# Patient Record
Sex: FEMALE | Race: WHITE
[De-identification: names, ages, dates, MRNs, and addresses within clinical notes are randomized per-mention and may not be internally consistent; named-entity substitution may affect disease eponyms.]

---

## 2017-09-25 ENCOUNTER — HOSPITAL ENCOUNTER (OUTPATIENT)
Dept: HOSPITAL 92 - LABBT | Age: 77
Discharge: HOME | End: 2017-09-25
Attending: ORTHOPAEDIC SURGERY
Payer: MEDICARE

## 2017-09-25 DIAGNOSIS — M65.311: ICD-10-CM

## 2017-09-25 DIAGNOSIS — M19.041: ICD-10-CM

## 2017-09-25 DIAGNOSIS — Z01.818: Primary | ICD-10-CM

## 2017-09-25 LAB
ANION GAP SERPL CALC-SCNC: 12 MMOL/L (ref 10–20)
APTT PPP: 26.7 SEC (ref 22.9–36.1)
BASOPHILS # BLD AUTO: 0.1 THOU/UL (ref 0–0.2)
BASOPHILS NFR BLD AUTO: 1 % (ref 0–1)
BUN SERPL-MCNC: 15 MG/DL (ref 9.8–20.1)
CALCIUM SERPL-MCNC: 9.7 MG/DL (ref 7.8–10.44)
CHLORIDE SERPL-SCNC: 95 MMOL/L (ref 98–107)
CO2 SERPL-SCNC: 34 MMOL/L (ref 23–31)
CREAT CL PREDICTED SERPL C-G-VRATE: 0 ML/MIN (ref 70–130)
EOSINOPHIL # BLD AUTO: 0.3 THOU/UL (ref 0–0.7)
EOSINOPHIL NFR BLD AUTO: 4.8 % (ref 0–10)
HCT VFR BLD CALC: 39 % (ref 36–47)
LYMPHOCYTES # BLD: 1.7 THOU/UL (ref 1.2–3.4)
LYMPHOCYTES NFR BLD AUTO: 30.1 % (ref 21–51)
MONOCYTES # BLD AUTO: 0.3 THOU/UL (ref 0.11–0.59)
MONOCYTES NFR BLD AUTO: 6.2 % (ref 0–10)
NEUTROPHILS # BLD AUTO: 3.2 THOU/UL (ref 1.4–6.5)
PROTHROMBIN TIME: 14.1 SEC (ref 12–14.7)
RBC # BLD AUTO: 4.35 MILL/UL (ref 4.2–5.4)
WBC # BLD AUTO: 5.5 THOU/UL (ref 4.8–10.8)

## 2017-09-25 PROCEDURE — 71020: CPT

## 2017-09-25 PROCEDURE — 93010 ELECTROCARDIOGRAM REPORT: CPT

## 2017-09-25 PROCEDURE — 85610 PROTHROMBIN TIME: CPT

## 2017-09-25 PROCEDURE — 93005 ELECTROCARDIOGRAM TRACING: CPT

## 2017-09-25 PROCEDURE — 85025 COMPLETE CBC W/AUTO DIFF WBC: CPT

## 2017-09-25 PROCEDURE — 80048 BASIC METABOLIC PNL TOTAL CA: CPT

## 2017-09-25 PROCEDURE — 85730 THROMBOPLASTIN TIME PARTIAL: CPT

## 2017-09-25 NOTE — RAD
CHEST TWO VIEWS:

 

History: Pre-operative. 

 

Comparison: 2-16-15

 

FINDINGS: 

Cardiac silhouette and pulmonary vasculature are unremarkable. Mediastinum is midline with post-oper
ative changes of the cervical spine partially visualized. There is no confluent airspace consolidati
on, pneumothorax, or pleural fluid apparent. Degenerative changes involve the thoracic spine on the 
lateral view. 

 

IMPRESSION: 

No active cardiopulmonary abnormalities are demonstrated. 

 

POS: [facility]

## 2017-09-26 ENCOUNTER — HOSPITAL ENCOUNTER (OUTPATIENT)
Dept: HOSPITAL 92 - SDC | Age: 77
Discharge: HOME | End: 2017-09-26
Attending: ORTHOPAEDIC SURGERY
Payer: MEDICARE

## 2017-09-26 VITALS — BODY MASS INDEX: 42.7 KG/M2

## 2017-09-26 DIAGNOSIS — I10: ICD-10-CM

## 2017-09-26 DIAGNOSIS — Z90.49: ICD-10-CM

## 2017-09-26 DIAGNOSIS — E11.9: ICD-10-CM

## 2017-09-26 DIAGNOSIS — Z90.710: ICD-10-CM

## 2017-09-26 DIAGNOSIS — Z88.0: ICD-10-CM

## 2017-09-26 DIAGNOSIS — Z79.84: ICD-10-CM

## 2017-09-26 DIAGNOSIS — E78.5: ICD-10-CM

## 2017-09-26 DIAGNOSIS — Z98.890: ICD-10-CM

## 2017-09-26 DIAGNOSIS — M65.311: ICD-10-CM

## 2017-09-26 DIAGNOSIS — M25.741: ICD-10-CM

## 2017-09-26 DIAGNOSIS — Z88.8: ICD-10-CM

## 2017-09-26 DIAGNOSIS — S63.041A: ICD-10-CM

## 2017-09-26 DIAGNOSIS — Z79.899: ICD-10-CM

## 2017-09-26 DIAGNOSIS — E66.01: ICD-10-CM

## 2017-09-26 DIAGNOSIS — K21.9: ICD-10-CM

## 2017-09-26 DIAGNOSIS — M19.041: Primary | ICD-10-CM

## 2017-09-26 DIAGNOSIS — Z79.82: ICD-10-CM

## 2017-09-26 DIAGNOSIS — Z88.1: ICD-10-CM

## 2017-09-26 DIAGNOSIS — Z88.2: ICD-10-CM

## 2017-09-26 DIAGNOSIS — K64.9: ICD-10-CM

## 2017-09-26 PROCEDURE — 76001: CPT

## 2017-09-26 PROCEDURE — 73140 X-RAY EXAM OF FINGER(S): CPT

## 2017-09-26 PROCEDURE — 25332 REVISE WRIST JOINT: CPT

## 2017-09-26 PROCEDURE — A4216 STERILE WATER/SALINE, 10 ML: HCPCS

## 2017-09-26 PROCEDURE — S0020 INJECTION, BUPIVICAINE HYDRO: HCPCS

## 2017-09-26 PROCEDURE — C1776 JOINT DEVICE (IMPLANTABLE): HCPCS

## 2017-09-26 PROCEDURE — 0RUS07Z SUPPLEMENT RIGHT CARPOMETACARPAL JOINT WITH AUTOLOGOUS TISSUE SUBSTITUTE, OPEN APPROACH: ICD-10-PCS | Performed by: ORTHOPAEDIC SURGERY

## 2017-09-26 PROCEDURE — 26055 INCISE FINGER TENDON SHEATH: CPT

## 2017-09-26 PROCEDURE — 0LN70ZZ RELEASE RIGHT HAND TENDON, OPEN APPROACH: ICD-10-PCS | Performed by: ORTHOPAEDIC SURGERY

## 2017-09-26 PROCEDURE — 0PTM0ZZ RESECTION OF RIGHT CARPAL, OPEN APPROACH: ICD-10-PCS | Performed by: ORTHOPAEDIC SURGERY

## 2017-09-26 PROCEDURE — 96374 THER/PROPH/DIAG INJ IV PUSH: CPT

## 2017-09-26 PROCEDURE — 20924 REMOVAL OF TENDON FOR GRAFT: CPT

## 2017-09-26 NOTE — EKG
Test Reason : PREOP

Blood Pressure : ***/*** mmHG

Vent. Rate : 064 BPM     Atrial Rate : 064 BPM

   P-R Int : 272 ms          QRS Dur : 102 ms

    QT Int : 420 ms       P-R-T Axes : 060 -28 036 degrees

   QTc Int : 433 ms

 

Sinus rhythm with 1st degree A-V block

Incomplete right bundle branch block

Borderline ECG

When compared with ECG of 20-NOV-2015 23:44,

No significant change was found

Confirmed by LEANDRA DAVIDSON ([id number]) on 9/26/2017 10:01:12 AM

 

Referred By:  ZINA           Confirmed By:LEANDRA DAVIDSON

## 2017-09-26 NOTE — RAD
THREE VIEWS OF THE RIGHT WRIST/THUMB:

 

FINDINGS/IMPRESSION:

Multiple limited intraoperative fluoroscopic views of the right wrist and thumb were submitted for i
nterpretation.  The patient has ongoing removal of the trapezium.  A K-wire spans the first and seco
nd metacarpals at the completion of the exam.

 

POS: [facility]

## 2017-09-27 NOTE — OP
DATE OF PROCEDURE:  09/26/2017

 

PREOPERATIVE DIAGNOSES:

1.  Right thumb stage IV carpometacarpal joint osteoarthritis and subluxation.

2.  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

 

POSTOPERATIVE DIAGNOSES:

1.  Right thumb stage IV carpometacarpal joint osteoarthritis and subluxation.

2.  Right thumb trigger digit stage III to IV with marked osteophyte formation, subluxation, bone on
 bone eburnation of more than 70% total joint surface area of metacarpal joint, very tight A1 pulley
 at the thumb inferolateral to the carpometacarpal joint thumb.

 

PROCEDURE PERFORMED:

1.  A1 pulley release thumb/trigger thumb release.

2.  Complete trapeziectomy, right thumb.

3.  [place] flexor carpi radialis tendon for tendon transfer.

4.  Carpometacarpal joint arthroplasty, ligament replacement, tendon interposition x6, C-arm supervi
tony with metacarpal for the thumb to index finger pinning.

 

COMPLICATIONS:  None.

 

TOURNIQUET TIME:  110 minutes.

 

INDICATIONS:  Failed conservative treatment with the findings, both preoperative and intraoperative 
listed above.

 

ANESTHESIA:  General LMA technique augmented by 20 mL 0.5% Marcaine total with no epinephrine done b
y [employer] [facility].

 

DESCRIPTION OF PROCEDURE:  After successful general anesthesia, the limb was prepped and draped.  C-
arm was brought to the field, confirmed the eburnated bone findings.  Once this was done, the limb w
as exsanguinated, tourniquet inflated to 250 mmHg pressure. The incisions were outlined to include a
 modified _____ incision at junction dorsal palmar skin along the carpometacarpal joint to the scaph
oid base distally.  We then also outlined incision for the A1 pulley where we made a longitudinal in
cision.  First, we did A1 pulley incision, carried to skin and subcutaneous tissue, identified the r
adial and ulnar nerve branches by taking from the center field.  The A1 pulley was very tight and th
ickened, so it was released with [place] blade in midline under direct visualization.  A 2 mL [place]
ne was placed and the wound was closed with interrupted 4-0 nylon.

 

Then, we made the curvilinear incision as approach to the carpometacarpal joint and junction to dors
al palmar skin.  Superficial radial nerve branch was identified and spared, we then lifted at the ju
nction of the fascia to the bone, lifted the thenar musculature up and then visualized the entire ca
rpometacarpal joint.  The incision was made _____ protecting the insertion of the muscles on the bas
e of the thumb, then we tagged the joint capsule with a simple 2-0 Vicryl.  At this point, we began 
our sharp dissection of the trapezium to perform a trapeziectomy,  identified the flexor carpal radi
tarsha terminal 5 cm protecting this.  We then placed a K-wire into the trapezoid to help lever it, to
ok an x-ray to confirm the trapezoid and visualized at the trapezium surface with the base of metaca
rpal set this bone with chondral loss and it was over 50% loss of bone with chondral loss, and we ex
posed bone at the base of the thumb.  All osteophytes removed from the thumb after the trapeziectomy
 was completed, then the chondral surfaces were shaved with a saw.  Then, we removed the C-arm from 
the field in line with the plain of thumb metacarpal drilled a hole with a 1.2 cm bone age on the la
teral portion of the thumb obliquely into the junction of the articular surface and the metacarpal o
n the 2nd metacarpal and of the first metacarpal.  This hole was then widened to 3.5 mm and curetted
 until it was progressively larger.  It was in perfect position in the sagittal plane and there were
 no complications from this drilling.  We irrigated the area.  We then put a heavy 3-0 Prolene sutur
e in the very deepest and ulnar portion of the carpometacarpal joint capsule just below the flexor c
arpal radialis tendon insertion.

 

We turned our attention now to the forearm where we made two small incisions, one 7 cm from the palm
ar wrist flexor crease, another 7 cm from the first incision, carried through the skin and subcutane
ous tissue, identified flexor carpi radialis and then harvested the tendon at the musculotendinous j
unction, brought this into the wrist.  We then removed all muscle, used a Vicryl 2-0 stitch to decre
ase the _____ so it could pass through the tunnel and passed it through the tunnel from the index fi
nger to the radial edge of the thumb metacarpal.  We then made sure it was completely free with no s
lack.  Once this was done, we brought the C-arm back to the field, pinned the metacarpal, and then w
as able to appropriately tension it in 4 different spots to include 2 on the thumb metacarpal, 1 on 
the extensor insertion to the thumb, and then a final 1 onto the flexor carpi radialis itself.   Onc
e this was done, and it was weaved deep to the flexor carpi radialis tendon, then we brought 2 [place]
 needles onto the field, placed the two 3-0 suture that had been run through the capsule earlier on 
the other [place] needle and formed the anchovy portion of the interpositional graft.  It was sutured 
down to the capsule with excellent tension and the primary limb of the flexor carpi radialis tendon 
was in the center of the base of the thumb in excellent position 1-2 mm over reduced in terms of rel
ation to the index finger metacarpal.  Tourniquet was deflated.  Hemostasis obtained.  The joint cap
harshad closed with 2-0 Vicryl over the ligament replacement portion of the tendon and position graft. 
 Then, hemostasis was obtained.  The fascia was then sutured back with 3-0 Monocryl, subcutaneous cl
osure with 4-0 Monocryl.  The pin was then cut below the skin and the final epidermal closure was ac
complished with 4-0 nylon interrupted central pattern.  Following injection of 10 mL 0.5% Marcaine g
iven along this incision, bulky dressing was applied with a splint and the patient left the operatin
g room without evidence of anesthetic or operative complication.

## 2018-10-19 ENCOUNTER — HOSPITAL ENCOUNTER (EMERGENCY)
Dept: HOSPITAL 92 - ERS | Age: 78
Discharge: HOME | End: 2018-10-19
Payer: MEDICARE

## 2018-10-19 DIAGNOSIS — E78.5: ICD-10-CM

## 2018-10-19 DIAGNOSIS — E11.9: ICD-10-CM

## 2018-10-19 DIAGNOSIS — I10: ICD-10-CM

## 2018-10-19 DIAGNOSIS — M54.16: Primary | ICD-10-CM

## 2018-10-19 DIAGNOSIS — M19.90: ICD-10-CM

## 2018-10-19 PROCEDURE — 96372 THER/PROPH/DIAG INJ SC/IM: CPT

## 2021-04-08 ENCOUNTER — HOSPITAL ENCOUNTER (EMERGENCY)
Dept: HOSPITAL 92 - ERS | Age: 81
Discharge: HOME | End: 2021-04-08
Payer: MEDICARE

## 2021-04-08 DIAGNOSIS — E11.9: ICD-10-CM

## 2021-04-08 DIAGNOSIS — R19.04: ICD-10-CM

## 2021-04-08 DIAGNOSIS — G47.30: ICD-10-CM

## 2021-04-08 DIAGNOSIS — Z79.899: ICD-10-CM

## 2021-04-08 DIAGNOSIS — E78.5: ICD-10-CM

## 2021-04-08 DIAGNOSIS — I10: ICD-10-CM

## 2021-04-08 DIAGNOSIS — K21.9: ICD-10-CM

## 2021-04-08 DIAGNOSIS — R19.7: Primary | ICD-10-CM

## 2021-04-08 DIAGNOSIS — E03.9: ICD-10-CM

## 2021-04-08 DIAGNOSIS — Z79.84: ICD-10-CM

## 2021-04-08 DIAGNOSIS — Z79.82: ICD-10-CM

## 2021-04-08 LAB
ALBUMIN SERPL BCG-MCNC: 3.9 G/DL (ref 3.4–4.8)
ALP SERPL-CCNC: 73 U/L (ref 40–110)
ALT SERPL W P-5'-P-CCNC: 10 U/L (ref 8–55)
ANION GAP SERPL CALC-SCNC: 17 MMOL/L (ref 10–20)
AST SERPL-CCNC: 19 U/L (ref 5–34)
BACTERIA UR QL AUTO: (no result) HPF
BASOPHILS # BLD AUTO: 0 THOU/UL (ref 0–0.2)
BASOPHILS NFR BLD AUTO: 0.1 % (ref 0–1)
BILIRUB SERPL-MCNC: 1 MG/DL (ref 0.2–1.2)
BUN SERPL-MCNC: 16 MG/DL (ref 9.8–20.1)
CALCIUM SERPL-MCNC: 9 MG/DL (ref 7.8–10.44)
CHLORIDE SERPL-SCNC: 95 MMOL/L (ref 98–107)
CO2 SERPL-SCNC: 29 MMOL/L (ref 23–31)
CREAT CL PREDICTED SERPL C-G-VRATE: 0 ML/MIN (ref 70–130)
EOSINOPHIL # BLD AUTO: 0.3 THOU/UL (ref 0–0.7)
EOSINOPHIL NFR BLD AUTO: 4.8 % (ref 0–10)
GLOBULIN SER CALC-MCNC: 3.2 G/DL (ref 2.4–3.5)
GLUCOSE SERPL-MCNC: 148 MG/DL (ref 83–110)
HGB BLD-MCNC: 14.2 G/DL (ref 12–16)
LYMPHOCYTES # BLD: 1.3 THOU/UL (ref 1.2–3.4)
LYMPHOCYTES NFR BLD AUTO: 21.3 % (ref 21–51)
MCH RBC QN AUTO: 27.7 PG (ref 27–31)
MCV RBC AUTO: 86.3 FL (ref 78–98)
MONOCYTES # BLD AUTO: 0.5 THOU/UL (ref 0.11–0.59)
MONOCYTES NFR BLD AUTO: 7.2 % (ref 0–10)
NEUTROPHILS # BLD AUTO: 4.2 THOU/UL (ref 1.4–6.5)
NEUTROPHILS NFR BLD AUTO: 66.6 % (ref 42–75)
PLATELET # BLD AUTO: 215 THOU/UL (ref 130–400)
POTASSIUM SERPL-SCNC: 3.2 MMOL/L (ref 3.5–5.1)
PROT UR STRIP.AUTO-MCNC: 70 MG/DL
RBC # BLD AUTO: 5.12 MILL/UL (ref 4.2–5.4)
RBC UR QL AUTO: (no result) HPF (ref 0–3)
SODIUM SERPL-SCNC: 138 MMOL/L (ref 136–145)
SP GR UR STRIP: 1.02 (ref 1–1.04)
WBC # BLD AUTO: 6.3 THOU/UL (ref 4.8–10.8)
WBC UR QL AUTO: (no result) HPF (ref 0–3)

## 2021-04-08 PROCEDURE — 81015 MICROSCOPIC EXAM OF URINE: CPT

## 2021-04-08 PROCEDURE — 80053 COMPREHEN METABOLIC PANEL: CPT

## 2021-04-08 PROCEDURE — 51701 INSERT BLADDER CATHETER: CPT

## 2021-04-08 PROCEDURE — 71045 X-RAY EXAM CHEST 1 VIEW: CPT

## 2021-04-08 PROCEDURE — 96374 THER/PROPH/DIAG INJ IV PUSH: CPT

## 2021-04-08 PROCEDURE — 85025 COMPLETE CBC W/AUTO DIFF WBC: CPT

## 2021-04-08 PROCEDURE — 96376 TX/PRO/DX INJ SAME DRUG ADON: CPT

## 2021-04-08 PROCEDURE — 74177 CT ABD & PELVIS W/CONTRAST: CPT

## 2021-04-08 PROCEDURE — 81003 URINALYSIS AUTO W/O SCOPE: CPT

## 2021-04-08 PROCEDURE — 96375 TX/PRO/DX INJ NEW DRUG ADDON: CPT

## 2021-04-08 PROCEDURE — 87086 URINE CULTURE/COLONY COUNT: CPT

## 2021-04-13 ENCOUNTER — HOSPITAL ENCOUNTER (EMERGENCY)
Dept: HOSPITAL 92 - ERS | Age: 81
Discharge: HOME | End: 2021-04-13
Payer: MEDICARE

## 2021-04-13 DIAGNOSIS — E03.9: ICD-10-CM

## 2021-04-13 DIAGNOSIS — G47.30: ICD-10-CM

## 2021-04-13 DIAGNOSIS — R19.7: ICD-10-CM

## 2021-04-13 DIAGNOSIS — R63.0: ICD-10-CM

## 2021-04-13 DIAGNOSIS — K21.9: ICD-10-CM

## 2021-04-13 DIAGNOSIS — E11.9: ICD-10-CM

## 2021-04-13 DIAGNOSIS — R10.84: Primary | ICD-10-CM

## 2021-04-13 DIAGNOSIS — R11.0: ICD-10-CM

## 2021-04-13 DIAGNOSIS — E78.5: ICD-10-CM

## 2021-04-13 DIAGNOSIS — I10: ICD-10-CM

## 2021-04-13 LAB
ALBUMIN SERPL BCG-MCNC: 4 G/DL (ref 3.4–4.8)
ALP SERPL-CCNC: 70 U/L (ref 40–110)
ALT SERPL W P-5'-P-CCNC: 12 U/L (ref 8–55)
ANION GAP SERPL CALC-SCNC: 18 MMOL/L (ref 10–20)
AST SERPL-CCNC: 28 U/L (ref 5–34)
BASOPHILS # BLD AUTO: 0 THOU/UL (ref 0–0.2)
BASOPHILS NFR BLD AUTO: 0.3 % (ref 0–1)
BILIRUB SERPL-MCNC: 1.1 MG/DL (ref 0.2–1.2)
BUN SERPL-MCNC: 14 MG/DL (ref 9.8–20.1)
CALCIUM SERPL-MCNC: 9.2 MG/DL (ref 7.8–10.44)
CHLORIDE SERPL-SCNC: 96 MMOL/L (ref 98–107)
CO2 SERPL-SCNC: 28 MMOL/L (ref 23–31)
CREAT CL PREDICTED SERPL C-G-VRATE: 0 ML/MIN (ref 70–130)
EOSINOPHIL # BLD AUTO: 0.3 THOU/UL (ref 0–0.7)
EOSINOPHIL NFR BLD AUTO: 4.4 % (ref 0–10)
GLOBULIN SER CALC-MCNC: 3.5 G/DL (ref 2.4–3.5)
GLUCOSE SERPL-MCNC: 141 MG/DL (ref 83–110)
HGB BLD-MCNC: 15 G/DL (ref 12–16)
LIPASE SERPL-CCNC: 14 U/L (ref 8–78)
LYMPHOCYTES # BLD: 1.5 THOU/UL (ref 1.2–3.4)
LYMPHOCYTES NFR BLD AUTO: 21.7 % (ref 21–51)
MCH RBC QN AUTO: 27.8 PG (ref 27–31)
MCV RBC AUTO: 86.6 FL (ref 78–98)
MONOCYTES # BLD AUTO: 0.5 THOU/UL (ref 0.11–0.59)
MONOCYTES NFR BLD AUTO: 7.7 % (ref 0–10)
NEUTROPHILS # BLD AUTO: 4.5 THOU/UL (ref 1.4–6.5)
NEUTROPHILS NFR BLD AUTO: 65.8 % (ref 42–75)
PLATELET # BLD AUTO: 206 THOU/UL (ref 130–400)
POTASSIUM SERPL-SCNC: 3.5 MMOL/L (ref 3.5–5.1)
PROT UR STRIP.AUTO-MCNC: 50 MG/DL
RBC # BLD AUTO: 5.39 MILL/UL (ref 4.2–5.4)
RBC UR QL AUTO: (no result) HPF (ref 0–3)
SODIUM SERPL-SCNC: 138 MMOL/L (ref 136–145)
SP GR UR STRIP: 1.02 (ref 1–1.04)
WBC # BLD AUTO: 6.9 THOU/UL (ref 4.8–10.8)
WBC UR QL AUTO: (no result) HPF (ref 0–3)

## 2021-04-13 PROCEDURE — 36415 COLL VENOUS BLD VENIPUNCTURE: CPT

## 2021-04-13 PROCEDURE — 83735 ASSAY OF MAGNESIUM: CPT

## 2021-04-13 PROCEDURE — 96375 TX/PRO/DX INJ NEW DRUG ADDON: CPT

## 2021-04-13 PROCEDURE — 81003 URINALYSIS AUTO W/O SCOPE: CPT

## 2021-04-13 PROCEDURE — 51701 INSERT BLADDER CATHETER: CPT

## 2021-04-13 PROCEDURE — 80053 COMPREHEN METABOLIC PANEL: CPT

## 2021-04-13 PROCEDURE — 96365 THER/PROPH/DIAG IV INF INIT: CPT

## 2021-04-13 PROCEDURE — 85025 COMPLETE CBC W/AUTO DIFF WBC: CPT

## 2021-04-13 PROCEDURE — 83690 ASSAY OF LIPASE: CPT

## 2021-04-13 PROCEDURE — 81015 MICROSCOPIC EXAM OF URINE: CPT

## 2021-04-13 PROCEDURE — 93005 ELECTROCARDIOGRAM TRACING: CPT

## 2021-04-18 ENCOUNTER — HOSPITAL ENCOUNTER (INPATIENT)
Dept: HOSPITAL 92 - ERS | Age: 81
LOS: 2 days | Discharge: HOME HEALTH | DRG: 308 | End: 2021-04-20
Attending: INTERNAL MEDICINE | Admitting: INTERNAL MEDICINE
Payer: MEDICARE

## 2021-04-18 VITALS — BODY MASS INDEX: 33.8 KG/M2

## 2021-04-18 DIAGNOSIS — Z98.84: ICD-10-CM

## 2021-04-18 DIAGNOSIS — Z90.710: ICD-10-CM

## 2021-04-18 DIAGNOSIS — Z88.1: ICD-10-CM

## 2021-04-18 DIAGNOSIS — R00.1: Primary | ICD-10-CM

## 2021-04-18 DIAGNOSIS — Z79.82: ICD-10-CM

## 2021-04-18 DIAGNOSIS — Z20.822: ICD-10-CM

## 2021-04-18 DIAGNOSIS — I11.0: ICD-10-CM

## 2021-04-18 DIAGNOSIS — I50.33: ICD-10-CM

## 2021-04-18 DIAGNOSIS — E87.6: ICD-10-CM

## 2021-04-18 DIAGNOSIS — F32.9: ICD-10-CM

## 2021-04-18 DIAGNOSIS — Z88.8: ICD-10-CM

## 2021-04-18 DIAGNOSIS — E11.9: ICD-10-CM

## 2021-04-18 DIAGNOSIS — G47.33: ICD-10-CM

## 2021-04-18 DIAGNOSIS — I45.10: ICD-10-CM

## 2021-04-18 DIAGNOSIS — Z98.1: ICD-10-CM

## 2021-04-18 DIAGNOSIS — Z90.49: ICD-10-CM

## 2021-04-18 DIAGNOSIS — Z88.0: ICD-10-CM

## 2021-04-18 DIAGNOSIS — Z88.2: ICD-10-CM

## 2021-04-18 DIAGNOSIS — E78.00: ICD-10-CM

## 2021-04-18 DIAGNOSIS — E03.9: ICD-10-CM

## 2021-04-18 DIAGNOSIS — F41.9: ICD-10-CM

## 2021-04-18 DIAGNOSIS — R77.8: ICD-10-CM

## 2021-04-18 DIAGNOSIS — T44.7X5A: ICD-10-CM

## 2021-04-18 DIAGNOSIS — E66.9: ICD-10-CM

## 2021-04-18 DIAGNOSIS — Z79.899: ICD-10-CM

## 2021-04-18 LAB
ALBUMIN SERPL BCG-MCNC: 4.2 G/DL (ref 3.4–4.8)
ALP SERPL-CCNC: 79 U/L (ref 40–110)
ALT SERPL W P-5'-P-CCNC: 11 U/L (ref 8–55)
ANION GAP SERPL CALC-SCNC: 13 MMOL/L (ref 10–20)
AST SERPL-CCNC: 20 U/L (ref 5–34)
BASOPHILS # BLD AUTO: 0 THOU/UL (ref 0–0.2)
BASOPHILS NFR BLD AUTO: 0.1 % (ref 0–1)
BILIRUB SERPL-MCNC: 0.8 MG/DL (ref 0.2–1.2)
BUN SERPL-MCNC: 19 MG/DL (ref 9.8–20.1)
CALCIUM SERPL-MCNC: 9.7 MG/DL (ref 7.8–10.44)
CHLORIDE SERPL-SCNC: 96 MMOL/L (ref 98–107)
CK MB SERPL-MCNC: 2.6 NG/ML (ref 0–6.6)
CO2 SERPL-SCNC: 33 MMOL/L (ref 23–31)
CREAT CL PREDICTED SERPL C-G-VRATE: 0 ML/MIN (ref 70–130)
EOSINOPHIL # BLD AUTO: 0.4 THOU/UL (ref 0–0.7)
EOSINOPHIL NFR BLD AUTO: 6.1 % (ref 0–10)
GLOBULIN SER CALC-MCNC: 3.1 G/DL (ref 2.4–3.5)
GLUCOSE SERPL-MCNC: 150 MG/DL (ref 83–110)
HGB BLD-MCNC: 15.2 G/DL (ref 12–16)
LYMPHOCYTES # BLD: 1.6 THOU/UL (ref 1.2–3.4)
LYMPHOCYTES NFR BLD AUTO: 26.8 % (ref 21–51)
MCH RBC QN AUTO: 29.8 PG (ref 27–31)
MCV RBC AUTO: 87 FL (ref 78–98)
MONOCYTES # BLD AUTO: 0.5 THOU/UL (ref 0.11–0.59)
MONOCYTES NFR BLD AUTO: 7.5 % (ref 0–10)
NEUTROPHILS # BLD AUTO: 3.6 THOU/UL (ref 1.4–6.5)
NEUTROPHILS NFR BLD AUTO: 59.5 % (ref 42–75)
PLATELET # BLD AUTO: 175 THOU/UL (ref 130–400)
POTASSIUM SERPL-SCNC: 3.4 MMOL/L (ref 3.5–5.1)
RBC # BLD AUTO: 5.11 MILL/UL (ref 4.2–5.4)
SODIUM SERPL-SCNC: 139 MMOL/L (ref 136–145)
SP GR UR STRIP: 1.01 (ref 1–1.04)
TROPONIN I SERPL DL<=0.01 NG/ML-MCNC: 0.04 NG/ML (ref ?–0.03)
TROPONIN I SERPL DL<=0.01 NG/ML-MCNC: 0.05 NG/ML (ref ?–0.03)
WBC # BLD AUTO: 6 THOU/UL (ref 4.8–10.8)

## 2021-04-18 PROCEDURE — U0003 INFECTIOUS AGENT DETECTION BY NUCLEIC ACID (DNA OR RNA); SEVERE ACUTE RESPIRATORY SYNDROME CORONAVIRUS 2 (SARS-COV-2) (CORONAVIRUS DISEASE [COVID-19]), AMPLIFIED PROBE TECHNIQUE, MAKING USE OF HIGH THROUGHPUT TECHNOLOGIES AS DESCRIBED BY CMS-2020-01-R: HCPCS

## 2021-04-18 PROCEDURE — 36415 COLL VENOUS BLD VENIPUNCTURE: CPT

## 2021-04-18 PROCEDURE — 96376 TX/PRO/DX INJ SAME DRUG ADON: CPT

## 2021-04-18 PROCEDURE — 84484 ASSAY OF TROPONIN QUANT: CPT

## 2021-04-18 PROCEDURE — G0378 HOSPITAL OBSERVATION PER HR: HCPCS

## 2021-04-18 PROCEDURE — 51701 INSERT BLADDER CATHETER: CPT

## 2021-04-18 PROCEDURE — 81003 URINALYSIS AUTO W/O SCOPE: CPT

## 2021-04-18 PROCEDURE — 80053 COMPREHEN METABOLIC PANEL: CPT

## 2021-04-18 PROCEDURE — 96374 THER/PROPH/DIAG INJ IV PUSH: CPT

## 2021-04-18 PROCEDURE — 36416 COLLJ CAPILLARY BLOOD SPEC: CPT

## 2021-04-18 PROCEDURE — 74177 CT ABD & PELVIS W/CONTRAST: CPT

## 2021-04-18 PROCEDURE — 93005 ELECTROCARDIOGRAM TRACING: CPT

## 2021-04-18 PROCEDURE — 83880 ASSAY OF NATRIURETIC PEPTIDE: CPT

## 2021-04-18 PROCEDURE — 80048 BASIC METABOLIC PNL TOTAL CA: CPT

## 2021-04-18 PROCEDURE — U0005 INFEC AGEN DETEC AMPLI PROBE: HCPCS

## 2021-04-18 PROCEDURE — 82553 CREATINE MB FRACTION: CPT

## 2021-04-18 PROCEDURE — 96372 THER/PROPH/DIAG INJ SC/IM: CPT

## 2021-04-18 PROCEDURE — 87635 SARS-COV-2 COVID-19 AMP PRB: CPT

## 2021-04-18 PROCEDURE — 85025 COMPLETE CBC W/AUTO DIFF WBC: CPT

## 2021-04-18 PROCEDURE — 83735 ASSAY OF MAGNESIUM: CPT

## 2021-04-18 PROCEDURE — 86304 IMMUNOASSAY TUMOR CA 125: CPT

## 2021-04-18 PROCEDURE — 93306 TTE W/DOPPLER COMPLETE: CPT

## 2021-04-18 PROCEDURE — 76856 US EXAM PELVIC COMPLETE: CPT

## 2021-04-18 RX ADMIN — NITROGLYCERIN SCH: 20 OINTMENT TOPICAL at 10:49

## 2021-04-18 RX ADMIN — Medication SCH ML: at 21:22

## 2021-04-18 RX ADMIN — NITROGLYCERIN SCH: 20 OINTMENT TOPICAL at 17:07

## 2021-04-18 RX ADMIN — ASPIRIN 81 MG CHEWABLE TABLET SCH: 81 TABLET CHEWABLE at 10:11

## 2021-04-18 RX ADMIN — THERA TABS SCH TAB: TAB at 10:07

## 2021-04-18 RX ADMIN — Medication SCH: at 10:49

## 2021-04-19 LAB
ANION GAP SERPL CALC-SCNC: 12 MMOL/L (ref 10–20)
BASOPHILS # BLD AUTO: 0 THOU/UL (ref 0–0.2)
BASOPHILS NFR BLD AUTO: 0.2 % (ref 0–1)
BUN SERPL-MCNC: 16 MG/DL (ref 9.8–20.1)
CALCIUM SERPL-MCNC: 8.6 MG/DL (ref 7.8–10.44)
CHLORIDE SERPL-SCNC: 103 MMOL/L (ref 98–107)
CO2 SERPL-SCNC: 29 MMOL/L (ref 23–31)
CREAT CL PREDICTED SERPL C-G-VRATE: 81 ML/MIN (ref 70–130)
EOSINOPHIL # BLD AUTO: 0.4 THOU/UL (ref 0–0.7)
EOSINOPHIL NFR BLD AUTO: 7.7 % (ref 0–10)
GLUCOSE SERPL-MCNC: 118 MG/DL (ref 83–110)
HGB BLD-MCNC: 12.4 G/DL (ref 12–16)
LYMPHOCYTES # BLD: 1.7 THOU/UL (ref 1.2–3.4)
LYMPHOCYTES NFR BLD AUTO: 30.4 % (ref 21–51)
MCH RBC QN AUTO: 29.3 PG (ref 27–31)
MCV RBC AUTO: 87.1 FL (ref 78–98)
MONOCYTES # BLD AUTO: 0.4 THOU/UL (ref 0.11–0.59)
MONOCYTES NFR BLD AUTO: 7.7 % (ref 0–10)
NEUTROPHILS # BLD AUTO: 3.1 THOU/UL (ref 1.4–6.5)
NEUTROPHILS NFR BLD AUTO: 54 % (ref 42–75)
PLATELET # BLD AUTO: 165 THOU/UL (ref 130–400)
POTASSIUM SERPL-SCNC: 3.9 MMOL/L (ref 3.5–5.1)
RBC # BLD AUTO: 4.24 MILL/UL (ref 4.2–5.4)
SODIUM SERPL-SCNC: 140 MMOL/L (ref 136–145)
WBC # BLD AUTO: 5.8 THOU/UL (ref 4.8–10.8)

## 2021-04-19 RX ADMIN — NITROGLYCERIN SCH INCH: 20 OINTMENT TOPICAL at 09:10

## 2021-04-19 RX ADMIN — NITROGLYCERIN SCH INCH: 20 OINTMENT TOPICAL at 02:36

## 2021-04-19 RX ADMIN — Medication SCH ML: at 22:12

## 2021-04-19 RX ADMIN — NITROGLYCERIN SCH: 20 OINTMENT TOPICAL at 09:06

## 2021-04-19 RX ADMIN — THERA TABS SCH TAB: TAB at 09:04

## 2021-04-19 RX ADMIN — NITROGLYCERIN SCH: 20 OINTMENT TOPICAL at 09:15

## 2021-04-19 RX ADMIN — ASPIRIN 81 MG CHEWABLE TABLET SCH MG: 81 TABLET CHEWABLE at 09:03

## 2021-04-19 RX ADMIN — Medication SCH ML: at 09:04

## 2021-04-20 VITALS — DIASTOLIC BLOOD PRESSURE: 70 MMHG | SYSTOLIC BLOOD PRESSURE: 148 MMHG | TEMPERATURE: 98.1 F

## 2021-04-20 RX ADMIN — THERA TABS SCH TAB: TAB at 08:09

## 2021-04-20 RX ADMIN — ASPIRIN 81 MG CHEWABLE TABLET SCH MG: 81 TABLET CHEWABLE at 08:07

## 2021-04-20 RX ADMIN — Medication SCH ML: at 08:10
